# Patient Record
Sex: FEMALE | Race: WHITE | NOT HISPANIC OR LATINO | URBAN - METROPOLITAN AREA
[De-identification: names, ages, dates, MRNs, and addresses within clinical notes are randomized per-mention and may not be internally consistent; named-entity substitution may affect disease eponyms.]

---

## 2021-01-07 ENCOUNTER — OUTPATIENT (OUTPATIENT)
Dept: OUTPATIENT SERVICES | Facility: HOSPITAL | Age: 51
LOS: 1 days | End: 2021-01-07
Payer: COMMERCIAL

## 2021-01-07 VITALS
TEMPERATURE: 98 F | DIASTOLIC BLOOD PRESSURE: 64 MMHG | SYSTOLIC BLOOD PRESSURE: 136 MMHG | OXYGEN SATURATION: 97 % | HEART RATE: 88 BPM | RESPIRATION RATE: 14 BRPM | WEIGHT: 188.05 LBS | HEIGHT: 59 IN

## 2021-01-07 DIAGNOSIS — Z98.890 OTHER SPECIFIED POSTPROCEDURAL STATES: Chronic | ICD-10-CM

## 2021-01-07 DIAGNOSIS — D25.0 SUBMUCOUS LEIOMYOMA OF UTERUS: ICD-10-CM

## 2021-01-07 DIAGNOSIS — Z01.818 ENCOUNTER FOR OTHER PREPROCEDURAL EXAMINATION: ICD-10-CM

## 2021-01-07 DIAGNOSIS — K08.409 PARTIAL LOSS OF TEETH, UNSPECIFIED CAUSE, UNSPECIFIED CLASS: Chronic | ICD-10-CM

## 2021-01-07 DIAGNOSIS — R10.2 PELVIC AND PERINEAL PAIN: ICD-10-CM

## 2021-01-07 DIAGNOSIS — Z90.89 ACQUIRED ABSENCE OF OTHER ORGANS: Chronic | ICD-10-CM

## 2021-01-07 DIAGNOSIS — N93.8 OTHER SPECIFIED ABNORMAL UTERINE AND VAGINAL BLEEDING: ICD-10-CM

## 2021-01-07 LAB
A1C WITH ESTIMATED AVERAGE GLUCOSE RESULT: 5.6 % — SIGNIFICANT CHANGE UP (ref 4–5.6)
ALBUMIN SERPL ELPH-MCNC: 3.6 G/DL — SIGNIFICANT CHANGE UP (ref 3.3–5)
ALP SERPL-CCNC: 48 U/L — SIGNIFICANT CHANGE UP (ref 40–120)
ALT FLD-CCNC: 18 U/L — SIGNIFICANT CHANGE UP (ref 12–78)
ANION GAP SERPL CALC-SCNC: 8 MMOL/L — SIGNIFICANT CHANGE UP (ref 5–17)
AST SERPL-CCNC: 14 U/L — LOW (ref 15–37)
BILIRUB SERPL-MCNC: 1.2 MG/DL — SIGNIFICANT CHANGE UP (ref 0.2–1.2)
BUN SERPL-MCNC: 21 MG/DL — SIGNIFICANT CHANGE UP (ref 7–23)
CALCIUM SERPL-MCNC: 9.5 MG/DL — SIGNIFICANT CHANGE UP (ref 8.5–10.1)
CHLORIDE SERPL-SCNC: 105 MMOL/L — SIGNIFICANT CHANGE UP (ref 96–108)
CO2 SERPL-SCNC: 26 MMOL/L — SIGNIFICANT CHANGE UP (ref 22–31)
CREAT SERPL-MCNC: 0.77 MG/DL — SIGNIFICANT CHANGE UP (ref 0.5–1.3)
ESTIMATED AVERAGE GLUCOSE: 114 MG/DL — SIGNIFICANT CHANGE UP (ref 68–114)
GLUCOSE SERPL-MCNC: 90 MG/DL — SIGNIFICANT CHANGE UP (ref 70–99)
HCG UR QL: NEGATIVE — SIGNIFICANT CHANGE UP
HCT VFR BLD CALC: 41 % — SIGNIFICANT CHANGE UP (ref 34.5–45)
HGB BLD-MCNC: 13.7 G/DL — SIGNIFICANT CHANGE UP (ref 11.5–15.5)
MCHC RBC-ENTMCNC: 28.6 PG — SIGNIFICANT CHANGE UP (ref 27–34)
MCHC RBC-ENTMCNC: 33.4 GM/DL — SIGNIFICANT CHANGE UP (ref 32–36)
MCV RBC AUTO: 85.6 FL — SIGNIFICANT CHANGE UP (ref 80–100)
NRBC # BLD: 0 /100 WBCS — SIGNIFICANT CHANGE UP (ref 0–0)
PLATELET # BLD AUTO: 294 K/UL — SIGNIFICANT CHANGE UP (ref 150–400)
POTASSIUM SERPL-MCNC: 3.9 MMOL/L — SIGNIFICANT CHANGE UP (ref 3.5–5.3)
POTASSIUM SERPL-SCNC: 3.9 MMOL/L — SIGNIFICANT CHANGE UP (ref 3.5–5.3)
PROT SERPL-MCNC: 7.4 G/DL — SIGNIFICANT CHANGE UP (ref 6–8.3)
RBC # BLD: 4.79 M/UL — SIGNIFICANT CHANGE UP (ref 3.8–5.2)
RBC # FLD: 13.1 % — SIGNIFICANT CHANGE UP (ref 10.3–14.5)
SODIUM SERPL-SCNC: 139 MMOL/L — SIGNIFICANT CHANGE UP (ref 135–145)
WBC # BLD: 8.26 K/UL — SIGNIFICANT CHANGE UP (ref 3.8–10.5)
WBC # FLD AUTO: 8.26 K/UL — SIGNIFICANT CHANGE UP (ref 3.8–10.5)

## 2021-01-07 PROCEDURE — 85027 COMPLETE CBC AUTOMATED: CPT

## 2021-01-07 PROCEDURE — 80053 COMPREHEN METABOLIC PANEL: CPT

## 2021-01-07 PROCEDURE — 36415 COLL VENOUS BLD VENIPUNCTURE: CPT

## 2021-01-07 PROCEDURE — 83036 HEMOGLOBIN GLYCOSYLATED A1C: CPT

## 2021-01-07 PROCEDURE — G0463: CPT

## 2021-01-07 PROCEDURE — 86901 BLOOD TYPING SEROLOGIC RH(D): CPT

## 2021-01-07 PROCEDURE — 86850 RBC ANTIBODY SCREEN: CPT

## 2021-01-07 PROCEDURE — 93010 ELECTROCARDIOGRAM REPORT: CPT

## 2021-01-07 PROCEDURE — 81025 URINE PREGNANCY TEST: CPT

## 2021-01-07 PROCEDURE — 93005 ELECTROCARDIOGRAM TRACING: CPT

## 2021-01-07 PROCEDURE — 86900 BLOOD TYPING SEROLOGIC ABO: CPT

## 2021-01-07 NOTE — H&P PST ADULT - NEGATIVE ENMT SYMPTOMS
no hearing difficulty/no nasal obstruction/no post-nasal discharge/no abnormal taste sensation/no throat pain/no dysphagia

## 2021-01-07 NOTE — H&P PST ADULT - VISION (WITH CORRECTIVE LENSES IF THE PATIENT USUALLY WEARS THEM):
Supposed to wear RX glasses however does not wear them/Normal vision: sees adequately in most situations; can see medication labels, newsprint

## 2021-01-07 NOTE — H&P PST ADULT - NSICDXPASTMEDICALHX_GEN_ALL_CORE_FT
PAST MEDICAL HISTORY:  Anxiety     COVID-19 DX 12/14/2020    History of palpitations     Hypercholesterolemia     Hypertension     Other specified abnormal uterine and vaginal bleeding     Pelvic and perineal pain     Submucous leiomyoma of uterus

## 2021-01-07 NOTE — H&P PST ADULT - SPO2 (%)
Airway patent, Nasal mucosa clear. Mouth with normal mucosa. Throat has no vesicles, no oropharyngeal exudates and uvula is midline. 97

## 2021-01-07 NOTE — H&P PST ADULT - NSANTHOSAYNRD_GEN_A_CORE
No. DENISE screening performed.  STOP BANG Legend: 0-2 = LOW Risk; 3-4 = INTERMEDIATE Risk; 5-8 = HIGH Risk

## 2021-01-07 NOTE — H&P PST ADULT - NSICDXFAMILYHX_GEN_ALL_CORE_FT
FAMILY HISTORY:  Family history of COPD (chronic obstructive pulmonary disease), Mother -  Age 54  Family history of coronary artery disease, Father -  Age 49 - H/O CABG X 3 at Age 36  Family history of heart attack, Father -  Age 49

## 2021-01-07 NOTE — H&P PST ADULT - HISTORY OF PRESENT ILLNESS
50 year old female  PMH HTN, Hypercholesterolemia, Palpitations, Other specified abnormal uterine and vagina bleeding, pelvic and perineal pain, Submucous Leiomyoma of uterus; presents for PST prior to Dilation & Curettage Hysteroscopy - Diagnostic Video Laparoscopy - Possible Bilateral Salpingectomy with Dr Александр Salomon on 2021. Pt notes H/O uterine fibroids and "Complex cyst." Pt notes abdominal pain secondary to the cysts.  Following discussions with Dr Salomon pt is electing for scheduled procedure.

## 2021-01-07 NOTE — H&P PST ADULT - GENERAL COMMENTS
Denies any travel in the last 14 days - notes she tested positive for COVID on 12/14/2020 - denies any current Symptoms - denies any recent exposure to anyone with COVID

## 2021-01-07 NOTE — H&P PST ADULT - NSICDXPROBLEM_GEN_ALL_CORE_FT
PROBLEM DIAGNOSES  Problem: Other specified abnormal uterine and vaginal bleeding  Assessment and Plan: PST Labs; CBC, CMP,  Type & Screen, UcG, EKG - Medical clearance with new PCP Dr Rae. Pt will schedule appointment. Pt instructed to stop any NSAIDS/Herbal Supplements between now and procedure. May take Tylenol if needed for pain between now and procedure. Morning of procedure pt may take her Metoprolol wit small sip of water. Pre-op instructions as well as pre-op wash instructions given to pt with understanding verbalized. Discussed with patient she will need to have COVID swab done 72-48 hours prior to procedure. This will be done at [facility] drive thru testing. Informed patient [facility] will call her to schedule appointment. Provided pt with phone number to [facility] should she have any questions. Pt verbalizes understanding of all instructions. All questions addressed with patient prior to her [facility] department.     Problem: Pelvic and perineal pain  Assessment and Plan: See Above Assessment and Plan (#1)    Problem: Submucous leiomyoma of uterus  Assessment and Plan: See Above Assessment and Plan (#1)

## 2021-01-07 NOTE — H&P PST ADULT - NSICDXPASTSURGICALHX_GEN_ALL_CORE_FT
PAST SURGICAL HISTORY:  H/O dilation and curettage X 3 - Third D&C included an endometrial ablation    H/O wisdom tooth extraction     History of tonsillectomy

## 2021-01-07 NOTE — H&P PST ADULT - ASSESSMENT
50 year old female  PMH HTN, Hypercholesterolemia, Palpitations, Other specified abnormal uterine and vagina bleeding, pelvic and perineal pain, Submucous Leiomyoma of uterus; scheduled for Dilation & Curettage Hysteroscopy - Diagnostic Video Laparoscopy - Possible Bilateral Salpingectomy with Dr Александр Salomon on 2021.

## 2021-01-09 ENCOUNTER — TRANSCRIPTION ENCOUNTER (OUTPATIENT)
Age: 51
End: 2021-01-09

## 2021-01-09 PROBLEM — I10 ESSENTIAL (PRIMARY) HYPERTENSION: Chronic | Status: ACTIVE | Noted: 2021-01-07

## 2021-01-09 PROBLEM — D25.0 SUBMUCOUS LEIOMYOMA OF UTERUS: Chronic | Status: ACTIVE | Noted: 2021-01-07

## 2021-01-09 PROBLEM — F41.9 ANXIETY DISORDER, UNSPECIFIED: Chronic | Status: ACTIVE | Noted: 2021-01-07

## 2021-01-09 PROBLEM — R10.2 PELVIC AND PERINEAL PAIN: Chronic | Status: ACTIVE | Noted: 2021-01-07

## 2021-01-09 PROBLEM — U07.1 COVID-19: Chronic | Status: ACTIVE | Noted: 2021-01-07

## 2021-01-09 PROBLEM — E78.00 PURE HYPERCHOLESTEROLEMIA, UNSPECIFIED: Chronic | Status: ACTIVE | Noted: 2021-01-07

## 2021-01-09 PROBLEM — Z87.898 PERSONAL HISTORY OF OTHER SPECIFIED CONDITIONS: Chronic | Status: ACTIVE | Noted: 2021-01-07

## 2021-01-09 PROBLEM — N93.8 OTHER SPECIFIED ABNORMAL UTERINE AND VAGINAL BLEEDING: Chronic | Status: ACTIVE | Noted: 2021-01-07

## 2021-01-11 ENCOUNTER — OUTPATIENT (OUTPATIENT)
Dept: OUTPATIENT SERVICES | Facility: HOSPITAL | Age: 51
LOS: 1 days | End: 2021-01-11
Payer: COMMERCIAL

## 2021-01-11 DIAGNOSIS — Z90.89 ACQUIRED ABSENCE OF OTHER ORGANS: Chronic | ICD-10-CM

## 2021-01-11 DIAGNOSIS — K08.409 PARTIAL LOSS OF TEETH, UNSPECIFIED CAUSE, UNSPECIFIED CLASS: Chronic | ICD-10-CM

## 2021-01-11 DIAGNOSIS — Z20.828 CONTACT WITH AND (SUSPECTED) EXPOSURE TO OTHER VIRAL COMMUNICABLE DISEASES: ICD-10-CM

## 2021-01-11 DIAGNOSIS — Z98.890 OTHER SPECIFIED POSTPROCEDURAL STATES: Chronic | ICD-10-CM

## 2021-01-11 LAB — SARS-COV-2 RNA SPEC QL NAA+PROBE: SIGNIFICANT CHANGE UP

## 2021-01-11 PROCEDURE — U0003: CPT

## 2021-01-11 PROCEDURE — U0005: CPT

## 2021-01-12 ENCOUNTER — TRANSCRIPTION ENCOUNTER (OUTPATIENT)
Age: 51
End: 2021-01-12

## 2021-01-12 NOTE — ASU PATIENT PROFILE, ADULT - PSH
H/O dilation and curettage  X 3 - Third D&C included an endometrial ablation  H/O wisdom tooth extraction    History of tonsillectomy

## 2021-01-12 NOTE — ASU PATIENT PROFILE, ADULT - PMH
Anxiety    COVID-19  DX 12/14/2020  History of palpitations    Hypercholesterolemia    Hypertension    Other specified abnormal uterine and vaginal bleeding    Pelvic and perineal pain    Submucous leiomyoma of uterus

## 2021-01-13 ENCOUNTER — RESULT REVIEW (OUTPATIENT)
Age: 51
End: 2021-01-13

## 2021-01-13 ENCOUNTER — OUTPATIENT (OUTPATIENT)
Dept: OUTPATIENT SERVICES | Facility: HOSPITAL | Age: 51
LOS: 1 days | End: 2021-01-13
Payer: COMMERCIAL

## 2021-01-13 VITALS
HEART RATE: 67 BPM | DIASTOLIC BLOOD PRESSURE: 63 MMHG | OXYGEN SATURATION: 97 % | RESPIRATION RATE: 16 BRPM | TEMPERATURE: 99 F | SYSTOLIC BLOOD PRESSURE: 102 MMHG

## 2021-01-13 VITALS
HEIGHT: 59 IN | WEIGHT: 188.05 LBS | HEART RATE: 93 BPM | SYSTOLIC BLOOD PRESSURE: 144 MMHG | TEMPERATURE: 98 F | DIASTOLIC BLOOD PRESSURE: 80 MMHG | RESPIRATION RATE: 16 BRPM | OXYGEN SATURATION: 98 %

## 2021-01-13 DIAGNOSIS — Z90.89 ACQUIRED ABSENCE OF OTHER ORGANS: Chronic | ICD-10-CM

## 2021-01-13 DIAGNOSIS — R10.2 PELVIC AND PERINEAL PAIN: ICD-10-CM

## 2021-01-13 DIAGNOSIS — K08.409 PARTIAL LOSS OF TEETH, UNSPECIFIED CAUSE, UNSPECIFIED CLASS: Chronic | ICD-10-CM

## 2021-01-13 DIAGNOSIS — Z98.890 OTHER SPECIFIED POSTPROCEDURAL STATES: Chronic | ICD-10-CM

## 2021-01-13 DIAGNOSIS — D25.0 SUBMUCOUS LEIOMYOMA OF UTERUS: ICD-10-CM

## 2021-01-13 DIAGNOSIS — N93.8 OTHER SPECIFIED ABNORMAL UTERINE AND VAGINAL BLEEDING: ICD-10-CM

## 2021-01-13 PROCEDURE — 86901 BLOOD TYPING SEROLOGIC RH(D): CPT

## 2021-01-13 PROCEDURE — 49321 LAPAROSCOPY BIOPSY: CPT | Mod: AS

## 2021-01-13 PROCEDURE — 88108 CYTOPATH CONCENTRATE TECH: CPT | Mod: 26

## 2021-01-13 PROCEDURE — 88305 TISSUE EXAM BY PATHOLOGIST: CPT | Mod: 26

## 2021-01-13 PROCEDURE — 58558 HYSTEROSCOPY BIOPSY: CPT

## 2021-01-13 PROCEDURE — 58662 LAPAROSCOPY EXCISE LESIONS: CPT | Mod: AS

## 2021-01-13 PROCEDURE — 86900 BLOOD TYPING SEROLOGIC ABO: CPT

## 2021-01-13 PROCEDURE — 36415 COLL VENOUS BLD VENIPUNCTURE: CPT

## 2021-01-13 PROCEDURE — 88108 CYTOPATH CONCENTRATE TECH: CPT

## 2021-01-13 PROCEDURE — 86850 RBC ANTIBODY SCREEN: CPT

## 2021-01-13 PROCEDURE — 88307 TISSUE EXAM BY PATHOLOGIST: CPT | Mod: 26

## 2021-01-13 PROCEDURE — 58661 LAPAROSCOPY REMOVE ADNEXA: CPT

## 2021-01-13 PROCEDURE — 86922 COMPATIBILITY TEST ANTIGLOB: CPT

## 2021-01-13 PROCEDURE — 88307 TISSUE EXAM BY PATHOLOGIST: CPT

## 2021-01-13 PROCEDURE — 88305 TISSUE EXAM BY PATHOLOGIST: CPT

## 2021-01-13 RX ORDER — SODIUM CHLORIDE 9 MG/ML
1000 INJECTION, SOLUTION INTRAVENOUS
Refills: 0 | Status: DISCONTINUED | OUTPATIENT
Start: 2021-01-13 | End: 2021-01-13

## 2021-01-13 RX ORDER — IBUPROFEN 200 MG
1 TABLET ORAL
Qty: 0 | Refills: 0 | DISCHARGE

## 2021-01-13 RX ORDER — METOCLOPRAMIDE HCL 10 MG
5 TABLET ORAL ONCE
Refills: 0 | Status: DISCONTINUED | OUTPATIENT
Start: 2021-01-13 | End: 2021-01-13

## 2021-01-13 RX ORDER — CHOLECALCIFEROL (VITAMIN D3) 125 MCG
1 CAPSULE ORAL
Qty: 0 | Refills: 0 | DISCHARGE

## 2021-01-13 RX ORDER — OXYCODONE HYDROCHLORIDE 5 MG/1
5 TABLET ORAL ONCE
Refills: 0 | Status: DISCONTINUED | OUTPATIENT
Start: 2021-01-13 | End: 2021-01-13

## 2021-01-13 RX ORDER — HYDROMORPHONE HYDROCHLORIDE 2 MG/ML
0.5 INJECTION INTRAMUSCULAR; INTRAVENOUS; SUBCUTANEOUS
Refills: 0 | Status: DISCONTINUED | OUTPATIENT
Start: 2021-01-13 | End: 2021-01-13

## 2021-01-13 RX ORDER — OXYCODONE HYDROCHLORIDE 5 MG/1
1 TABLET ORAL
Qty: 10 | Refills: 0
Start: 2021-01-13

## 2021-01-13 RX ORDER — ALPRAZOLAM 0.25 MG
1 TABLET ORAL
Qty: 0 | Refills: 0 | DISCHARGE

## 2021-01-13 RX ORDER — ATORVASTATIN CALCIUM 80 MG/1
1 TABLET, FILM COATED ORAL
Qty: 0 | Refills: 0 | DISCHARGE

## 2021-01-13 RX ORDER — METOPROLOL TARTRATE 50 MG
1 TABLET ORAL
Qty: 0 | Refills: 0 | DISCHARGE

## 2021-01-13 RX ADMIN — SODIUM CHLORIDE 110 MILLILITER(S): 9 INJECTION, SOLUTION INTRAVENOUS at 16:02

## 2021-01-13 RX ADMIN — OXYCODONE HYDROCHLORIDE 5 MILLIGRAM(S): 5 TABLET ORAL at 16:06

## 2021-01-13 RX ADMIN — SODIUM CHLORIDE 50 MILLILITER(S): 9 INJECTION, SOLUTION INTRAVENOUS at 12:00

## 2021-01-13 NOTE — BRIEF OPERATIVE NOTE - OPERATION/FINDINGS
Uncomplicated Laparoscopic bilateral salpingectomies performed, excision of Right paratubal cyst, Left ovarian biopsy, D&C hysteroscopy

## 2021-01-13 NOTE — ASU DISCHARGE PLAN (ADULT/PEDIATRIC) - PROCEDURE
Laparoscopic bilateral salpingectomy, removal of Left Right paratubal cyst, biopsy of ovarian tissue

## 2021-01-13 NOTE — ASU DISCHARGE PLAN (ADULT/PEDIATRIC) - ASU DC SPECIAL INSTRUCTIONSFT
*Call the office with any problems including but not limited to heavy vaginal bleeding, fevers, severe abdominal pain, inability to eat/ drink/ urinate. Nothing in the vagina x2 weeks- no sex, tampons, douching. *You may shower as usual but no bath tubs, hot tubs or swimming pools x2 weeks.    Call Dr. Salomon's office for an appointment for follow up in 1 week

## 2021-01-13 NOTE — ASU DISCHARGE PLAN (ADULT/PEDIATRIC) - C. MAKE IMPORTANT PERSONAL OR BUSINESS DECISIONS
Pt here for MMR vaccine - last [facility] 09/20/18 This office note has been dictated.  Medical assistant history and information reviewed.  Past Medical History:   Diagnosis Date   • Negative History of CA, HTN, DM, CAD, CVA, DVT, liver disease, migraine    • SUPRV HIGH-RISK PREG NOS 8/12/2009        Statement Selected

## 2021-01-13 NOTE — ASU DISCHARGE PLAN (ADULT/PEDIATRIC) - CARE PROVIDER_API CALL
Александр Salomon  OBSTETRICS AND GYNECOLOGY  [address] [place] [id number]  Phone: [phone]  Fax: [phone]  Follow Up Time:

## 2021-01-13 NOTE — BRIEF OPERATIVE NOTE - NSICDXBRIEFPROCEDURE_GEN_ALL_CORE_FT
PROCEDURES:  D&C (dilatation and curettage, scraping of uterus) 13-Jan-2021 16:47:52 Video hysteroscopy Radha Owens  Salpingectomy, laparoscopic 13-Jan-2021 16:47:09 Bilateral, removal of Right paratubal cyst, left ovarian biopsy Radha Owens  Laparoscopic salpingectomy 13-Jan-2021 16:46:48  Radha Owens

## 2021-01-15 LAB
NON-GYNECOLOGICAL CYTOLOGY STUDY: SIGNIFICANT CHANGE UP
SURGICAL PATHOLOGY STUDY: SIGNIFICANT CHANGE UP

## 2022-12-30 PROBLEM — Z00.00 ENCOUNTER FOR PREVENTIVE HEALTH EXAMINATION: Status: ACTIVE | Noted: 2022-12-30

## 2023-01-13 ENCOUNTER — APPOINTMENT (OUTPATIENT)
Dept: ORTHOPEDIC SURGERY | Facility: CLINIC | Age: 53
End: 2023-01-13
Payer: COMMERCIAL

## 2023-01-13 VITALS — WEIGHT: 190 LBS | BODY MASS INDEX: 38.3 KG/M2 | HEIGHT: 59 IN

## 2023-01-13 DIAGNOSIS — M54.16 RADICULOPATHY, LUMBAR REGION: ICD-10-CM

## 2023-01-13 DIAGNOSIS — M75.41 IMPINGEMENT SYNDROME OF RIGHT SHOULDER: ICD-10-CM

## 2023-01-13 PROCEDURE — 73030 X-RAY EXAM OF SHOULDER: CPT | Mod: RT

## 2023-01-13 PROCEDURE — 72100 X-RAY EXAM L-S SPINE 2/3 VWS: CPT

## 2023-01-13 PROCEDURE — 99213 OFFICE O/P EST LOW 20 MIN: CPT

## 2023-01-13 NOTE — DISCUSSION/SUMMARY
[de-identified] : Will have her start course of PT and see how she progresses. She should also f/u with the Rheumatologist

## 2023-01-13 NOTE — PHYSICAL EXAM
[Facet arthropathy] : Facet arthropathy [Disc space narrowing] : Disc space narrowing [] : no swelling [Right] : right shoulder [There are no fractures, subluxations or dislocations. No significant abnormalities are seen] : There are no fractures, subluxations or dislocations. No significant abnormalities are seen

## 2023-01-13 NOTE — HISTORY OF PRESENT ILLNESS
[Lower back] : lower back [8] : 8 [5] : 5 [Dull/Aching] : dull/aching [Sharp] : sharp [Intermittent] : intermittent [Nothing helps with pain getting better] : Nothing helps with pain getting better [de-identified] : History of Present Illness\par 6/4/21- Was fine on mothers day, developed pain in tailbone which has now become pain all over including back, right\par shoulder/arm. No tick bites or rashes. No other medical issues\par  [] : no [FreeTextEntry1] : LT Hip [FreeTextEntry5] : Lower back pain and Left hip pain which started yeas ago. Pt was seen in the past by Dr Vanegas who refered her to a rheumatologist

## 2023-12-02 NOTE — REASON FOR VISIT
[FreeTextEntry2] : 1/13/23- Saw Rheumatologist, was put on steroids for a while, no definitive diagnosis made. Having back pain to buttock, occasionally to legs, right shoulder pain on/off actual

## 2023-12-17 ENCOUNTER — NON-APPOINTMENT (OUTPATIENT)
Age: 53
End: 2023-12-17